# Patient Record
(demographics unavailable — no encounter records)

---

## 2025-07-22 NOTE — DISCUSSION/SUMMARY
[FreeTextEntry1] : Paroxysmal AF s/p DCCV  SR today.  No recent palpitations.   Monitor HR on Apple watch Follow-up 1 year / as needed

## 2025-07-22 NOTE — PHYSICAL EXAM
[Well Developed] : well developed [No Acute Distress] : no acute distress [Normal Conjunctiva] : normal conjunctiva [No Carotid Bruit] : no carotid bruit [Clear Lung Fields] : clear lung fields [Good Air Entry] : good air entry [No Respiratory Distress] : no respiratory distress  [Soft] : abdomen soft [Non Tender] : non-tender [No Edema] : no edema [No Rash] : no rash [Moves all extremities] : moves all extremities [No Focal Deficits] : no focal deficits [Alert and Oriented] : alert and oriented [de-identified] : irregular, no murmurs, no LE edema

## 2025-07-22 NOTE — HISTORY OF PRESENT ILLNESS
[FreeTextEntry1] : Former patient of Dr. Morton / EP Dr. Jarad Gutierrez.  History of asymptomatic AF at age 14 diagnosed on routine screening for the volleyball team.  Successful cardioversion in March 2018.  Ablation attempted but deemed unsuccessful.  No recurrent episodes.  Otherwise healthy and uneventful childhood.  Subsequent serial cardiac testing has been normal.  Last stress test in July 2021 was normal.  Multiple family members with atrial fibrillation.  10/2023:  New onset palpitations.  EKG today shows  bpm. FRANCIS:  No JOVANA thrombus CTA Aorta:  No aortic atheroma  8/2024:  Doing well.  No complaints.  SR today.  7/2025:  No complaints.